# Patient Record
Sex: MALE | NOT HISPANIC OR LATINO | URBAN - NONMETROPOLITAN AREA
[De-identification: names, ages, dates, MRNs, and addresses within clinical notes are randomized per-mention and may not be internally consistent; named-entity substitution may affect disease eponyms.]

---

## 2018-11-29 ENCOUNTER — OFFICE VISIT CONVERTED (OUTPATIENT)
Dept: FAMILY MEDICINE CLINIC | Age: 38
End: 2018-11-29
Attending: NURSE PRACTITIONER

## 2021-05-18 NOTE — PROGRESS NOTES
Michael Godinez 1980     Office/Outpatient Visit    Visit Date: Thu, Nov 29, 2018 11:11 am    Provider: Laura Mays N.P. (Assistant: Dora Almanzar MA)    Location: [facility]        Electronically signed by Laura Mays N.P. on  11/29/2018 09:57:38 PM                             SUBJECTIVE:        CC:     Manolo is a 38 year old White male.  He presents with scratched left eye yesterday, no swollen & irritated.          HPI:         cleaning tub drain yesterday.  while pulling back on tool his thumb poked left eye. left eye watered and was mildly puffy yesterday.  denies pain.  a little better today but irritated.  denies any foreign body of eye.  cannot recall last eye exam.  denies using glassses or contacts.      ROS:     CONSTITUTIONAL:  Negative for chills, fatigue, fever, and weight change.      EYES:  Positive for eye drainage ( clear left eye drainage ) and left eye feels irritated.   Negative for blurred vision, eye pain, use of glasses or contacts or photophobia.      CARDIOVASCULAR:  Negative for chest pain, palpitations, tachycardia, orthopnea, and edema.      RESPIRATORY:  Negative for cough, dyspnea, and hemoptysis.      MUSCULOSKELETAL:  Negative for arthralgias, back pain, and myalgias.      NEUROLOGICAL:  Negative for dizziness, headaches, paresthesias, and weakness.          PMH/FMH/SH:     Last Reviewed on 11/29/2018 11:27 AM by Laura Mays    Past Medical History:             PREVENTIVE HEALTH MAINTENANCE             DENTAL CLEANING: was last done 2017 - [facility]     EYE EXAM: years ago         PAST MEDICAL HISTORY         Positive for    Sleep Apnea: dx'd in 12/2017; since age 38 yo; (+) sleep study; uses CPAP; ;         CURRENT MEDICAL PROVIDERS:    E/N/T: [facility]    Pulmonologist: [facility] (sleep apnea)         Surgical History:     NONE         Family History:         Mother: Medical history unknown     Sister(s):  Renal/Genitourinary Renal Stones     Paternal Grandfather: Bone Tumor; Prostate Cancer ( prostate )     Maternal Grandfather: Myocardial Infarction         Social History:     Occupation: [employer] -      Marital Status:      Children: 1 child         Tobacco/Alcohol/Supplements:     Tobacco: He has a past history of cigarette smoking; quit date:  only smoked as teenager.          Substance Abuse History:     None         Mental Health History:     NEGATIVE             Current Problems:     Obstructive sleep apnea     Screening for cholesterol level     Fatigue     Hearing difficulties         Immunizations:     None        Allergies:     Last Reviewed on 11/28/2017 04:34 PM by Maxwell Mosher      No Known Drug Allergies.         Current Medications:     Last Reviewed on 11/29/2018 11:14 AM by Dora Almanzar      No Known Medications.         OBJECTIVE:        Vitals:         Historical:     11/28/2017  BP:   134/82 mm Hg ( (right arm, , sitting, );)     11/28/2017  Wt:   221.4lbs        Current: 11/29/2018 11:17:02 AM    Ht:  5 ft, 9 in;  Wt: 197.6 lbs;  BMI: 29.2    T: 98.6 F;  BP: 124/75 mm Hg (left arm, sitting);  P: 48 bpm (left arm (BP Cuff), sitting);  sCr: 0.92 mg/dL;  GFR: 105.88        Repeat:     11:17:42 AM     P:   71bpm (finger clip, sitting, pulse running from 46-81)         Exams:     PHYSICAL EXAM:     GENERAL:  well developed and nourished; appropriately groomed; in no apparent distress;     EYES: lids and lacrimal system are normal in appearance; extraocular movements intact; left corneal abrasion seen by fluorescein stain, hyperemic left conjunctiva;  pupils and irises are normal;     RESPIRATORY: normal respiratory rate and pattern with no distress; normal breath sounds with no rales, rhonchi, wheezes or rubs;     CARDIOVASCULAR: normal rate; rhythm is regular;     MUSCULOSKELETAL:  Normal range of motion, strength and tone;     NEUROLOGIC: mental  status: alert and oriented x 3; GROSSLY INTACT     PSYCHIATRIC:  appropriate affect and demeanor; normal speech pattern; grossly normal memory;         ASSESSMENT           918.1   S05.02XA  Corneal abrasion              DDx:         ORDERS:         Meds Prescribed:       Tobramycin 0.3% Ophthalmic Solution 1 gtt 4 times per day for 1 week  #5 (Five) ml Refills: 0                 PLAN:          Corneal abrasion         RECOMMENDATIONS given include: if not improving in 24 to 48 hrs needs to see eye dr.  no sign of foreign body.  if concern for foreign body needs to see eye dr..            Prescriptions:       Tobramycin 0.3% Ophthalmic Solution 1 gtt 4 times per day for 1 week  #5 (Five) ml Refills: 0             CHARGE CAPTURE           **Please note: ICD descriptions below are intended for billing purposes only and may not represent clinical diagnoses**        Primary Diagnosis:         918.1 Corneal abrasion            S05.02XA    Injury of conjunctiva and corneal abrasion without foreign body, left eye, initial encounter              Orders:          30407   Office/outpatient visit; established patient, level 3  (In-House)

## 2021-07-01 VITALS
HEIGHT: 69 IN | BODY MASS INDEX: 29.27 KG/M2 | TEMPERATURE: 98.6 F | HEART RATE: 71 BPM | WEIGHT: 197.6 LBS | SYSTOLIC BLOOD PRESSURE: 124 MMHG | DIASTOLIC BLOOD PRESSURE: 75 MMHG

## 2022-05-24 ENCOUNTER — HOSPITAL ENCOUNTER (EMERGENCY)
Dept: HOSPITAL 49 - FER | Age: 42
Discharge: HOME | End: 2022-05-24
Payer: SELF-PAY

## 2022-05-24 VITALS — WEIGHT: 220 LBS | BODY MASS INDEX: 32.58 KG/M2 | HEIGHT: 69 IN

## 2022-05-24 DIAGNOSIS — S52.611A: ICD-10-CM
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
## 2022-05-24 DIAGNOSIS — Z28.310: ICD-10-CM
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
## 2022-05-24 DIAGNOSIS — Z20.822: ICD-10-CM

## 2022-05-24 DIAGNOSIS — S52.501A: Primary | ICD-10-CM

## 2022-05-24 PROCEDURE — U0002 COVID-19 LAB TEST NON-CDC: HCPCS

## 2022-05-27 ENCOUNTER — HOSPITAL ENCOUNTER (OUTPATIENT)
Dept: HOSPITAL 49 - FAS | Age: 42
Discharge: HOME | End: 2022-05-27
Attending: ORTHOPAEDIC SURGERY
Payer: SELF-PAY

## 2022-05-27 VITALS — WEIGHT: 220 LBS | BODY MASS INDEX: 32.58 KG/M2 | HEIGHT: 69 IN

## 2022-05-27 DIAGNOSIS — Z91.030: ICD-10-CM
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
## 2022-05-27 DIAGNOSIS — S52.571A: Primary | ICD-10-CM

## 2022-05-27 LAB
ALBUMIN SERPL-MCNC: 4 G/DL (ref 3.4–5)
ALKALINE PHOSHATASE: 74 U/L (ref 46–116)
ALT SERPL-CCNC: 44 U/L (ref 16–63)
AST: 28 U/L (ref 15–37)
BILIRUBIN - TOTAL: 0.9 MG/DL (ref 0.2–1)
BUN SERPL-MCNC: 14 MG/DL (ref 7–18)
BUN/CREAT RATIO (CALC): 16.3 RATIO
CHLORIDE: 103 MMOL/L (ref 98–107)
CO2 (BICARBONATE): 28 MMOL/L (ref 21–32)
CREATININE: 0.86 MG/DL (ref 0.67–1.17)
GLOBULIN (CALCULATION): 3.5 G/DL
GLUCOSE SERPL-MCNC: 98 MG/DL (ref 74–106)
HCT: 46.5 % (ref 42–52)
HGB BLD-MCNC: 16.2 G/DL (ref 13.2–18)
MCH RBC QN AUTO: 33.1 PG (ref 25–31)
MCHC RBC AUTO-ENTMCNC: 34.8 G/DL (ref 32–36)
MCV: 95.1 FL (ref 78–100)
MPV: 9.9 FL (ref 6–9.5)
PLT: 265 K/UL (ref 150–400)
POTASSIUM: 4.4 MMOL/L (ref 3.5–5.1)
RBC: 4.89 M/UL (ref 4.7–6)
RDW: 11.7 % (ref 11.5–14)
TOTAL PROTEIN: 7.5 G/DL (ref 6.4–8.2)
WBC: 8.9 K/UL (ref 4–10.5)